# Patient Record
Sex: FEMALE | Race: NATIVE HAWAIIAN OR OTHER PACIFIC ISLANDER | NOT HISPANIC OR LATINO | URBAN - METROPOLITAN AREA
[De-identification: names, ages, dates, MRNs, and addresses within clinical notes are randomized per-mention and may not be internally consistent; named-entity substitution may affect disease eponyms.]

---

## 2024-04-08 ENCOUNTER — APPOINTMENT (RX ONLY)
Dept: URBAN - METROPOLITAN AREA CLINIC 293 | Facility: CLINIC | Age: 44
Setting detail: DERMATOLOGY
End: 2024-04-08

## 2024-04-08 DIAGNOSIS — L81.1 CHLOASMA: ICD-10-CM | Status: INADEQUATELY CONTROLLED

## 2024-04-08 PROCEDURE — ? PHOTO-DOCUMENTATION

## 2024-04-08 PROCEDURE — ? COUNSELING

## 2024-04-08 PROCEDURE — ? PRESCRIPTION MEDICATION MANAGEMENT

## 2024-04-08 PROCEDURE — ? IN-HOUSE DISPENSING PHARMACY

## 2024-04-08 PROCEDURE — 99204 OFFICE O/P NEW MOD 45 MIN: CPT

## 2024-04-08 ASSESSMENT — LOCATION ZONE DERM: LOCATION ZONE: FACE

## 2024-04-08 ASSESSMENT — LOCATION DETAILED DESCRIPTION DERM: LOCATION DETAILED: LEFT CENTRAL MALAR CHEEK

## 2024-04-08 ASSESSMENT — LOCATION SIMPLE DESCRIPTION DERM: LOCATION SIMPLE: LEFT CHEEK

## 2024-04-08 NOTE — PROCEDURE: PRESCRIPTION MEDICATION MANAGEMENT
Initiate Treatment: Hydroquinone 8%/ tretinoin 0.025% cream (in house prescription)
Detail Level: Zone
Render In Strict Bullet Format?: No
Plan: Discussed using Azelaic acid in 3 months for maintenance when patient follows up.

## 2024-04-08 NOTE — PROCEDURE: IN-HOUSE DISPENSING PHARMACY
Product 56 Amount/Unit (Numbers Only): 0
Product 43 Unit Type: mg
Render Refills If Set To 0: Yes
Name Of Product 1: Hydroquinone 8%/ tretinoin 0.025% cream
Product 1 Units Dispensed: 1
Product 1 Application Directions: Apply to face qhs as tolerated.
Product 1 Amount/Unit (Numbers Only): 30
Detail Level: Zone
Send Charges To Patient Encounter: No
Product 1 Price/Unit (In Dollars): 55
Product 1 Refills: 3